# Patient Record
Sex: MALE | Race: WHITE | Employment: OTHER | URBAN - METROPOLITAN AREA
[De-identification: names, ages, dates, MRNs, and addresses within clinical notes are randomized per-mention and may not be internally consistent; named-entity substitution may affect disease eponyms.]

---

## 2021-03-31 RX ORDER — ROSUVASTATIN CALCIUM 20 MG/1
TABLET, COATED ORAL
Qty: 60 TABLET | Refills: 2 | Status: SHIPPED | OUTPATIENT
Start: 2021-03-31

## 2021-05-20 ENCOUNTER — HOSPITAL ENCOUNTER (OUTPATIENT)
Age: 41
Discharge: HOME | End: 2021-05-20
Payer: MEDICARE

## 2021-05-20 LAB
ALBUMIN SERPL-MCNC: 4.3 GM/DL (ref 3.4–5)
ALP BLD-CCNC: 87 IU/L (ref 40–129)
ALT SERPL-CCNC: 33 U/L (ref 10–40)
ANION GAP SERPL CALCULATED.3IONS-SCNC: 11 MMOL/L (ref 4–16)
AST SERPL-CCNC: 26 IU/L (ref 15–37)
BASOPHILS ABSOLUTE: 0 K/CU MM
BASOPHILS RELATIVE PERCENT: 0.5 % (ref 0–1)
BILIRUB SERPL-MCNC: 0.5 MG/DL (ref 0–1)
BUN BLDV-MCNC: 11 MG/DL (ref 6–23)
CALCIUM SERPL-MCNC: 10.1 MG/DL (ref 8.3–10.6)
CHLORIDE BLD-SCNC: 102 MMOL/L (ref 99–110)
CHOLESTEROL: 229 MG/DL
CO2: 22 MMOL/L (ref 21–32)
CREAT SERPL-MCNC: 0.9 MG/DL (ref 0.9–1.3)
DIFFERENTIAL TYPE: ABNORMAL
EOSINOPHILS ABSOLUTE: 0.2 K/CU MM
EOSINOPHILS RELATIVE PERCENT: 2.6 % (ref 0–3)
GFR AFRICAN AMERICAN: >60 ML/MIN/1.73M2
GFR NON-AFRICAN AMERICAN: >60 ML/MIN/1.73M2
GLUCOSE BLD-MCNC: 165 MG/DL (ref 70–99)
HCT VFR BLD CALC: 45.1 % (ref 42–52)
HDLC SERPL-MCNC: 55 MG/DL
HEMOGLOBIN: 15.1 GM/DL (ref 13.5–18)
IMMATURE NEUTROPHIL %: 0.4 % (ref 0–0.43)
LDL CHOLESTEROL DIRECT: 166 MG/DL
LYMPHOCYTES ABSOLUTE: 3.3 K/CU MM
LYMPHOCYTES RELATIVE PERCENT: 39.9 % (ref 24–44)
MCH RBC QN AUTO: 32.9 PG (ref 27–31)
MCHC RBC AUTO-ENTMCNC: 33.5 % (ref 32–36)
MCV RBC AUTO: 98.3 FL (ref 78–100)
MONOCYTES ABSOLUTE: 0.7 K/CU MM
MONOCYTES RELATIVE PERCENT: 8.6 % (ref 0–4)
NUCLEATED RBC %: 0 %
PDW BLD-RTO: 13.5 % (ref 11.7–14.9)
PLATELET # BLD: 261 K/CU MM (ref 140–440)
PMV BLD AUTO: 8.9 FL (ref 7.5–11.1)
POTASSIUM SERPL-SCNC: 4.4 MMOL/L (ref 3.5–5.1)
RBC # BLD: 4.59 M/CU MM (ref 4.6–6.2)
SEGMENTED NEUTROPHILS ABSOLUTE COUNT: 4 K/CU MM
SEGMENTED NEUTROPHILS RELATIVE PERCENT: 48 % (ref 36–66)
SODIUM BLD-SCNC: 135 MMOL/L (ref 135–145)
TOTAL IMMATURE NEUTOROPHIL: 0.03 K/CU MM
TOTAL NUCLEATED RBC: 0 K/CU MM
TOTAL PROTEIN: 6.5 GM/DL (ref 6.4–8.2)
TRIGL SERPL-MCNC: 99 MG/DL
TSH HIGH SENSITIVITY: 3.14 UIU/ML (ref 0.27–4.2)
WBC # BLD: 8.4 K/CU MM (ref 4–10.5)

## 2021-05-20 PROCEDURE — 80053 COMPREHEN METABOLIC PANEL: CPT

## 2021-05-20 PROCEDURE — 80061 LIPID PANEL: CPT

## 2021-05-20 PROCEDURE — 84443 ASSAY THYROID STIM HORMONE: CPT

## 2021-05-20 PROCEDURE — 82306 VITAMIN D 25 HYDROXY: CPT

## 2021-05-20 PROCEDURE — 36415 COLL VENOUS BLD VENIPUNCTURE: CPT

## 2021-05-20 PROCEDURE — 83721 ASSAY OF BLOOD LIPOPROTEIN: CPT

## 2021-05-20 PROCEDURE — 85025 COMPLETE CBC W/AUTO DIFF WBC: CPT

## 2021-05-23 LAB
VITAMIN D 1,25-DIHYDROXY: 64.2 PG/ML (ref 19.9–79.3)
VITAMIN D 25-HYDROXY: 24.1 NG/ML

## 2024-06-15 ENCOUNTER — APPOINTMENT (OUTPATIENT)
Dept: GENERAL RADIOLOGY | Age: 44
End: 2024-06-15
Payer: MEDICARE

## 2024-06-15 ENCOUNTER — HOSPITAL ENCOUNTER (EMERGENCY)
Age: 44
Discharge: HOME | End: 2024-06-15
Attending: EMERGENCY MEDICINE
Payer: MEDICARE

## 2024-06-15 VITALS
BODY MASS INDEX: 27.13 KG/M2 | WEIGHT: 179 LBS | HEART RATE: 96 BPM | SYSTOLIC BLOOD PRESSURE: 144 MMHG | TEMPERATURE: 98.8 F | OXYGEN SATURATION: 98 % | RESPIRATION RATE: 18 BRPM | DIASTOLIC BLOOD PRESSURE: 94 MMHG | HEIGHT: 68 IN

## 2024-06-15 DIAGNOSIS — S92.355A CLOSED NONDISPLACED FRACTURE OF FIFTH METATARSAL BONE OF LEFT FOOT, INITIAL ENCOUNTER: Primary | ICD-10-CM

## 2024-06-15 PROCEDURE — 73630 X-RAY EXAM OF FOOT: CPT

## 2024-06-15 PROCEDURE — 99283 EMERGENCY DEPT VISIT LOW MDM: CPT

## 2024-06-15 PROCEDURE — 6370000000 HC RX 637 (ALT 250 FOR IP): Performed by: EMERGENCY MEDICINE

## 2024-06-15 RX ORDER — HYDROCODONE BITARTRATE AND ACETAMINOPHEN 5; 325 MG/1; MG/1
1 TABLET ORAL EVERY 4 HOURS PRN
Qty: 18 TABLET | Refills: 0 | Status: SHIPPED | OUTPATIENT
Start: 2024-06-15 | End: 2024-06-18

## 2024-06-15 RX ORDER — HYDROCODONE BITARTRATE AND ACETAMINOPHEN 5; 325 MG/1; MG/1
1 TABLET ORAL ONCE
Status: COMPLETED | OUTPATIENT
Start: 2024-06-15 | End: 2024-06-15

## 2024-06-15 RX ADMIN — HYDROCODONE BITARTRATE AND ACETAMINOPHEN 1 TABLET: 5; 325 TABLET ORAL at 20:44

## 2024-06-15 ASSESSMENT — LIFESTYLE VARIABLES
HOW OFTEN DO YOU HAVE A DRINK CONTAINING ALCOHOL: NEVER
HOW MANY STANDARD DRINKS CONTAINING ALCOHOL DO YOU HAVE ON A TYPICAL DAY: PATIENT DOES NOT DRINK

## 2024-06-15 ASSESSMENT — PAIN SCALES - GENERAL
PAINLEVEL_OUTOF10: 6
PAINLEVEL_OUTOF10: 6
PAINLEVEL_OUTOF10: 2

## 2024-06-15 ASSESSMENT — PAIN DESCRIPTION - ORIENTATION: ORIENTATION: LEFT

## 2024-06-15 ASSESSMENT — PAIN - FUNCTIONAL ASSESSMENT
PAIN_FUNCTIONAL_ASSESSMENT: 0-10
PAIN_FUNCTIONAL_ASSESSMENT: 0-10

## 2024-06-15 ASSESSMENT — PAIN DESCRIPTION - LOCATION: LOCATION: FOOT

## 2024-06-15 ASSESSMENT — PAIN DESCRIPTION - DESCRIPTORS: DESCRIPTORS: ACHING

## 2024-06-16 NOTE — ED TRIAGE NOTES
Patient presents to the ED from home with complaints of a left foot injury. Patient states he fell off his porch and rolled his foot. States pain is 6/10 at this time, no medications PTA. Denies pain in his ankle, states it is all in the top of his foot.

## 2024-06-16 NOTE — DISCHARGE INSTR - COC
ADLs:[id number]}  Med Admin  {[facility] DME ADLs:058028217}  Med Delivery   { [facility] MED Delivery:160048465}    Wound Care Documentation and Therapy:        Elimination:  Continence:   Bowel: {YES / NO:}  Bladder: {YES / NO:}  Urinary Catheter: {Urinary Catheter:945824583}   Colostomy/Ileostomy/Ileal Conduit: {YES / NO:}       Date of Last BM: ***  No intake or output data in the 24 hours ending 06/15/24 2133  No intake/output data recorded.    Safety Concerns:     { [facility] Safety Concerns:542635299}    Impairments/Disabilities:      { [facility] Impairments/Disabilities:196340386}    Nutrition Therapy:  Current Nutrition Therapy:   [place] [place]641946567}    Routes of Feeding: {[facility] DME Other Feedings:972082868}  Liquids: {Slp liquid thickness:09733}  Daily Fluid Restriction: {[facility] DME Yes amt example:571714551}  Last Modified Barium Swallow with Video (Video Swallowing Test): {Done Not Done Date:}    Treatments at the Time of Hospital Discharge:   Respiratory Treatments: ***  Oxygen Therapy:  {Therapy; copd oxygen:35293}  Ventilator:    {[facility] Vent List:386370601}    Rehab Therapies: {THERAPEUTIC INTERVENTION:7202585956}  Weight Bearing Status/Restrictions: {[facility] Weight Bearin}  Other Medical Equipment (for information only, NOT a DME order):  {EQUIPMENT:451225467}  Other Treatments: ***    Patient's personal belongings (please select all that are sent with patient):  {[facility] DME Belongings:663408127}    RN SIGNATURE:  {Esignature:848408806}    CASE MANAGEMENT/SOCIAL WORK SECTION    Inpatient Status Date: ***    Readmission Risk Assessment Score:  Readmission Risk              Risk of Unplanned Readmission:  0           Discharging to Facility/ Agency   Name:   Address:  Phone:  Fax:    Dialysis Facility (if applicable)   Name:  Address:  Dialysis Schedule:  Phone:  Fax:    / signature: {Esignature:565616058}    PHYSICIAN SECTION    Prognosis:

## 2024-06-16 NOTE — ED PROVIDER NOTES
Triage Chief Complaint:   Foot Injury (left)    [place]:  Adam Campbell is a 43 y.o. male that presents with left foot pain.  Patient was in baseline state of health until he stepped off of a porch and rolled his left ankle.  Patient reports persistent left lateral foot pain.  There was no head injury or loss of conscious.  Patient denies any numbness or weakness.  Patient denies pain in any other location.  Pain is currently 6 out of 10.  Patient has previously fractured his calcaneus of this foot healing with splinting/casting.    ROS:  General:  No fevers, no chills  Respiratory:  No shortness of breath  Neurologic:  No numbness, no weakness  Extremities:  No edema, + pain  Skin:  No rash  Psych: No axienty    Past Medical History:   Diagnosis Date    Allergy     Anxiety     Bipolar affective (HCC)     Depression     Explosive personality disorder (HCC)     Paranoid schizophrenia (HCC)      Past Surgical History:   Procedure Laterality Date    HERNIA REPAIR      At age [age]     Family History   Problem Relation Age of Onset    Depression Mother     High Blood Pressure Father     Allergies Sister     Asthma Sister     Depression Sister     Migraines Sister     Cancer Maternal Grandmother     Cancer Maternal Grandfather     Cancer Paternal Grandfather     Heart Disease Paternal Grandfather     Substance Abuse Other      Social History     Socioeconomic History    Marital status: Single     Spouse name: Not on file    Number of children: Not on file    Years of education: Not on file    Highest education level: Not on file   Occupational History    Not on file   Tobacco Use    Smoking status: Every Day     Current packs/day: 0.50     Average packs/day: 0.5 packs/day for 19.0 years (9.5 ttl pk-yrs)     Types: Cigarettes    Smokeless tobacco: Former     Types: Snuff, Chew     Quit date: 5/16/1999    Tobacco comments:     counseling on smoking cessation 5/16/2011   Substance and Sexual Activity    Alcohol use: No

## 2024-06-19 ENCOUNTER — OFFICE VISIT (OUTPATIENT)
Dept: ORTHOPEDIC SURGERY | Age: 44
End: 2024-06-19

## 2024-06-19 VITALS
OXYGEN SATURATION: 97 % | BODY MASS INDEX: 26.98 KG/M2 | WEIGHT: 178 LBS | RESPIRATION RATE: 14 BRPM | HEART RATE: 105 BPM | HEIGHT: 68 IN

## 2024-06-19 DIAGNOSIS — S92.355A CLOSED NONDISPLACED FRACTURE OF FIFTH METATARSAL BONE OF LEFT FOOT, INITIAL ENCOUNTER: Primary | ICD-10-CM

## 2024-06-19 RX ORDER — LEVOTHYROXINE SODIUM 0.03 MG/1
TABLET ORAL
COMMUNITY
Start: 2021-11-15

## 2024-06-19 ASSESSMENT — ENCOUNTER SYMPTOMS
ABDOMINAL PAIN: 0
EYE REDNESS: 0
COLOR CHANGE: 0
BACK PAIN: 0
CHEST TIGHTNESS: 0

## 2024-06-19 NOTE — PROGRESS NOTES
reviewed.   Constitutional:       Appearance: He is well-developed.   HENT:      Head: Normocephalic and atraumatic.      Nose: No congestion.   Eyes:      Pupils: Pupils are equal, round, and reactive to light.   Pulmonary:      Effort: Pulmonary effort is normal.   Musculoskeletal:         General: Swelling and tenderness present. No deformity. Normal range of motion.      Cervical back: Normal range of motion.      Right knee: Normal.      Left knee: Normal.      Right ankle: No swelling, deformity, ecchymosis or lacerations. No tenderness. No lateral malleolus, medial malleolus, AITF ligament or proximal fibula tenderness. Normal range of motion. Normal pulse.      Right Achilles Tendon: Normal.      Left ankle: No swelling, deformity, ecchymosis or lacerations. No tenderness. No lateral malleolus, medial malleolus, AITF ligament or proximal fibula tenderness. Normal range of motion. Normal pulse.      Left Achilles Tendon: Normal.      Left foot: Swelling, tenderness and bony tenderness present.   Skin:     General: Skin is warm and dry.      Capillary Refill: Capillary refill takes less than 2 seconds.      Coloration: Skin is not pale.      Findings: No erythema or rash.   Neurological:      Mental Status: He is alert and oriented to person, place, and time.      Sensory: No sensory deficit.      Motor: No weakness.     Left foot-Skin intact with no erythema, or lacerations present; mild ecchymosis.  Moderate tenderness over the base of the fifth metatarsal.    XRAY  X-ray 3 views of the left foot from Cathi 15, 2024 reviewed by me today in the office demonstrates age appropriate bone density throughout with a short oblique fracture through the proximal fifth metatarsal which is nondisplaced, no subluxation or dislocation noted.         Assessment   Left fifth metatarsal base fracture      Plan   I discussed with him today his x-ray findings.  I explained to him that he does have a nondisplaced fracture in his

## 2024-06-19 NOTE — PATIENT INSTRUCTIONS
Continue weight-bearing as tolerated in the cam boot.  Continue range of motion exercises as instructed.  Ice and elevate as needed.  Tylenol or Motrin for pain.  Follow up in 6 weeks.    We are committed to providing you the best care possible.  If you receive a survey after visiting one of our offices, please take time to share your experience concerning your physician office visit.  These surveys are confidential and no health information about you is shared.  We are eager to improve for you and we are counting on your feedback to help make that happen.

## 2024-07-31 ENCOUNTER — OFFICE VISIT (OUTPATIENT)
Dept: ORTHOPEDIC SURGERY | Age: 44
End: 2024-07-31

## 2024-07-31 VITALS — BODY MASS INDEX: 27.06 KG/M2 | HEART RATE: 79 BPM | OXYGEN SATURATION: 96 % | RESPIRATION RATE: 15 BRPM | HEIGHT: 68 IN

## 2024-07-31 DIAGNOSIS — S92.355D CLOSED NONDISPLACED FRACTURE OF FIFTH METATARSAL BONE OF LEFT FOOT WITH ROUTINE HEALING, SUBSEQUENT ENCOUNTER: Primary | ICD-10-CM

## 2024-07-31 PROCEDURE — 99024 POSTOP FOLLOW-UP VISIT: CPT | Performed by: ORTHOPAEDIC SURGERY

## 2024-07-31 ASSESSMENT — ENCOUNTER SYMPTOMS
COLOR CHANGE: 0
EYE REDNESS: 0
CHEST TIGHTNESS: 0
ABDOMINAL PAIN: 0
BACK PAIN: 0

## 2024-07-31 NOTE — PROGRESS NOTES
Patient seen in office today for FU LT 5th Metatarsal Fx DOI 6/19/24. X-rays taken. Stated he does have some soreness on lateral side of foot. 0/10 pain level. Has been wearing boot.

## 2024-07-31 NOTE — PROGRESS NOTES
Subjective   Patient ID: Adam Campbell is a 43 y.o. male.      Patient seen in office today for FU LT 5th Metatarsal Fx DOI 6/19/24. X-rays taken. Stated he does have some soreness on lateral side of foot. 0/10 pain level. Has been wearing boot.       He comes in today for his 6-week recheck of his left foot fracture which we are treating conservatively.  He states that overall he is feeling much better and has been able to bear full weight on the left foot and his walking boot.  Patient denies any new injury to the involved extremity/ joint, denies numbness or tingling in the involved extremity and denies fever or chills.            Review of Systems   Constitutional:  Negative for activity change, chills and fever.   HENT:  Negative for congestion and drooling.    Eyes:  Negative for redness.   Respiratory:  Negative for chest tightness.    Cardiovascular:  Negative for chest pain.   Gastrointestinal:  Negative for abdominal pain.   Endocrine: Negative for cold intolerance and heat intolerance.   Musculoskeletal:  Negative for arthralgias, back pain, gait problem, joint swelling and myalgias.   Skin:  Negative for color change, pallor, rash and wound.   Neurological:  Negative for weakness and numbness.   Psychiatric/Behavioral:  Negative for confusion.        Past Medical History:   Diagnosis Date    Allergy     Anxiety     Bipolar affective (HCC)     Depression     Explosive personality disorder (HCC)     Paranoid schizophrenia (HCC)             Objective   Physical Exam  Vitals and nursing note reviewed.   Constitutional:       Appearance: He is well-developed.   HENT:      Head: Normocephalic and atraumatic.      Nose: No congestion.   Eyes:      Pupils: Pupils are equal, round, and reactive to light.   Pulmonary:      Effort: Pulmonary effort is normal.   Musculoskeletal:         General: No swelling, tenderness or deformity. Normal range of motion.      Cervical back: Normal range of motion.

## 2024-07-31 NOTE — PATIENT INSTRUCTIONS
Continue weight-bearing as tolerated.  Continue range of motion exercises as instructed.  Ice and elevate as needed.  Tylenol or Motrin for pain.   Follow up as needed.    We are committed to providing you the best care possible.     If you receive a survey after visiting one of our offices, please take time to share your experience concerning your physician office visit.  These surveys are confidential and no health information about you is shared.     We are eager to improve for you and we are counting on your feedback to help make that happen.